# Patient Record
Sex: MALE | Race: BLACK OR AFRICAN AMERICAN | NOT HISPANIC OR LATINO | Employment: UNEMPLOYED
[De-identification: names, ages, dates, MRNs, and addresses within clinical notes are randomized per-mention and may not be internally consistent; named-entity substitution may affect disease eponyms.]

---

## 2019-07-25 ENCOUNTER — RECORDS - HEALTHEAST (OUTPATIENT)
Dept: ADMINISTRATIVE | Facility: OTHER | Age: 54
End: 2019-07-25

## 2019-08-21 ENCOUNTER — COMMUNICATION - HEALTHEAST (OUTPATIENT)
Dept: VASCULAR SURGERY | Facility: CLINIC | Age: 54
End: 2019-08-21

## 2019-08-21 ENCOUNTER — AMBULATORY - HEALTHEAST (OUTPATIENT)
Dept: VASCULAR SURGERY | Facility: CLINIC | Age: 54
End: 2019-08-21

## 2019-08-21 DIAGNOSIS — M79.662 BILATERAL CALF PAIN: ICD-10-CM

## 2019-08-21 DIAGNOSIS — M79.661 BILATERAL CALF PAIN: ICD-10-CM

## 2019-08-21 DIAGNOSIS — I73.9 PAD (PERIPHERAL ARTERY DISEASE) (H): ICD-10-CM

## 2019-08-29 ENCOUNTER — RECORDS - HEALTHEAST (OUTPATIENT)
Dept: VASCULAR ULTRASOUND | Facility: CLINIC | Age: 54
End: 2019-08-29

## 2019-08-29 DIAGNOSIS — I73.9 PERIPHERAL VASCULAR DISEASE, UNSPECIFIED (H): ICD-10-CM

## 2019-09-06 ENCOUNTER — COMMUNICATION - HEALTHEAST (OUTPATIENT)
Dept: VASCULAR SURGERY | Facility: CLINIC | Age: 54
End: 2019-09-06

## 2021-06-01 NOTE — TELEPHONE ENCOUNTER
1. Called [facility] Dr Brent Allred's office. Left message for Yamilet MARTINEZ,  on his team. Let them know ABIs are normal and patient does not need to be seen in our clinic with Dr Ceja and we will cancel appt for 9/11/19 per ABIs are normal and not a vascular issue. Let them know I will call patient to cancel appointment and tell them Dr Allred's office will be contacting them to suggest next step.     2. Called patient. Left message stating we are cancelling appointment on 9/11/19 due to ABIs are normal and he does not need to be seen in our clinic because it is not a vascular issue. I gave our #. I let him know I called Dr Allred's team and let them know this is not a vascular issue and they need to call patient to suggest next step. Also left patient number for [facility]. [phone].

## 2022-05-10 ENCOUNTER — TRANSCRIBE ORDERS (OUTPATIENT)
Dept: VASCULAR SURGERY | Facility: CLINIC | Age: 57
End: 2022-05-10
Payer: COMMERCIAL

## 2022-05-10 DIAGNOSIS — M79.661 PAIN OF RIGHT LOWER LEG: Primary | ICD-10-CM

## 2022-05-11 DIAGNOSIS — G47.33 OSA (OBSTRUCTIVE SLEEP APNEA): Primary | ICD-10-CM

## 2022-05-23 ENCOUNTER — TELEPHONE (OUTPATIENT)
Dept: VASCULAR SURGERY | Facility: CLINIC | Age: 57
End: 2022-05-23
Payer: COMMERCIAL

## 2022-05-23 NOTE — TELEPHONE ENCOUNTER
Safia from [facility] would like a call back to discuss why this patient is not going to be seen at our clinic.  Per the referral notes, the patient had testing done in 2019 and it was normal so pt does not need to be seen.  She would like to speak to someone on how we make those decisions.      [phone]

## 2022-05-27 NOTE — TELEPHONE ENCOUNTER
Called and left vm yesterday and again this morning to call back to discuss.     Based on previous notes, patient was referred to our clinic in 2019 by [facility] for the same symptoms as this referral. Ultrasound ABIs were performed at that time and was ruled out that blood flow was adequate. Dr. Ceja had reviewed ultrasound and cancelled the referral as it was not needed since result was normal.     Primary can consider repeating test again or can also consider to see if this is neurogenic claudication (spine referral).

## 2022-12-22 ENCOUNTER — MEDICAL CORRESPONDENCE (OUTPATIENT)
Dept: HEALTH INFORMATION MANAGEMENT | Facility: CLINIC | Age: 57
End: 2022-12-22